# Patient Record
Sex: MALE | Race: WHITE | NOT HISPANIC OR LATINO | URBAN - METROPOLITAN AREA
[De-identification: names, ages, dates, MRNs, and addresses within clinical notes are randomized per-mention and may not be internally consistent; named-entity substitution may affect disease eponyms.]

---

## 2023-11-16 ENCOUNTER — APPOINTMENT (OUTPATIENT)
Dept: PRIMARY CARE | Facility: CLINIC | Age: 68
End: 2023-11-16
Payer: MEDICARE

## 2024-01-19 ENCOUNTER — APPOINTMENT (OUTPATIENT)
Dept: PRIMARY CARE | Facility: CLINIC | Age: 69
End: 2024-01-19
Payer: MEDICARE

## 2024-02-01 ENCOUNTER — EVALUATION (OUTPATIENT)
Dept: PHYSICAL THERAPY | Facility: CLINIC | Age: 69
End: 2024-02-01
Payer: MEDICARE

## 2024-02-01 DIAGNOSIS — M54.2 CERVICALGIA: ICD-10-CM

## 2024-02-01 PROCEDURE — 97161 PT EVAL LOW COMPLEX 20 MIN: CPT | Mod: GP

## 2024-02-01 PROCEDURE — 97110 THERAPEUTIC EXERCISES: CPT | Mod: GP

## 2024-02-01 ASSESSMENT — PAIN SCALES - GENERAL: PAINLEVEL_OUTOF10: 0 - NO PAIN

## 2024-02-01 ASSESSMENT — PAIN - FUNCTIONAL ASSESSMENT: PAIN_FUNCTIONAL_ASSESSMENT: 0-10

## 2024-02-01 ASSESSMENT — PAIN DESCRIPTION - DESCRIPTORS: DESCRIPTORS: ACHING

## 2024-02-01 NOTE — PROGRESS NOTES
Physical Therapy Evaluation and Treatment      Patient Name: Peewee Valle  MRN: [id number]  Today's Date: 2/1/2024  Time Calculation  Start Time: 0800  Stop Time: 0845  Time Calculation (min): 45 min  PT Evaluation Time Entry  PT Evaluation (Low) Time Entry: 35     PT Therapeutic Procedures Time Entry  Therapeutic Exercise Time Entry: 10    Visit Number:  1 (including evaluation)  Planned total visits: 6  Insurance authorization information: MEDICARE A/B ACTIVE/ $240 DED (MET)/ 80% COVERAGE/ $0 PT/ST USED TO DATE/ AARP ACTIVE SECONDARY/ NO AUTH/ PER RTE    Current Problem:   1. Cervicalgia  PT eval and treat    Follow Up In Physical Therapy          Relevant Imaging:  No recent relevant imaging available     Subjective  /General:  General  Reason for Referral: Cervicalgia  Referred By: Usman Rose PA-C  Patient reported hx of current condition: The pt states that he has had chronic bilat shoulder and neck pain. He notes that in 2022 he had PT that focused on both shoulders, which helped, but then in early 2023, the pain moved toward the neck. He notes that he had PT again in July of 2023, with little relief. He notes that he continued the exercises for a short time after, but then stopped doing them. He notes that most of his symptoms are stiffness.    Aggravating factors: turning his head L to R - pt struggles with looking over shoulder while driving  Relieving factors: moving around, heat, gentle stretch     Precautions:  Precautions  Hearing/Visual Limitations: R hearing aid  Precautions Comment: pt denies red flags    Red flags   Hx of CA No   Pacemaker/ Electronic Implant No   Sudden Onset/ New or Changing HAs No   Sudden Weakness No   Bowel/Bladder Changes No   Nausea No   Nystagmus No   Ataxia No   Diplopia No   Dizziness No   Dysarthria No   Dysphagia No   Drop Attacks No   Recent Falls (within last 6mo) No     Pain:  Pain Assessment  Pain Assessment: 0-10  Pain Score: 0 - No pain (no pain at rest; 5/10 with  "turning head)  Pain Type: Chronic pain  Pain Location: Neck  Pain Orientation: Mid  Pain Radiating Towards: no radiations  Pain Descriptors: Aching  Pain Frequency: Intermittent  Home Living:    Lives with: Spouse  Home type: House  Stairs: Yes with handrails  Prior Level of Function:  Prior Function Per Pt/Caregiver Report  Vocational: Retired  Hand Dominance: Right    Objective   Posture:  Posture Comment: Pt seated with forward head, rounded shoulders, increased thoracic kyphosis, and flattened lumbar lordosis  Palpation:  Palpation Comment: Mild hypertonicity through upper quarter musculature, most notable in bilat UT, no tenderness to palpation reported. Supine PA springing C3-T1 with generalized hypomobility, lateral c-spine glides C3-T1 mildly hypomobile with R to L glides, moderately hypomobile with L to R glides  Gait:  Gait Comment: Pt ambulates into the clinic indep without AD. No significant deviations noted this date.  Other:       Cervical AROM Range   Flexion 60deg   Extension 45deg   R rotation 60deg*   L rotation 55deg* more painful than R rotation   R sidebend 30deg   L sidebend 30deg      Shoulder AROM L R   Flexion 160 deg 160 deg   Abduction 160 deg 160 deg   Functional External Rotation T3 T3   Functional Internal Rotation T10 L1      Shoulder MMT L R   Flexion 5/5 5/5   Abduction 5/5 5/5   External Rotation 5/5 5/5   Internal Rotation 5/5 5/5   Middle trap 4+/5 4+/5   Low trap 4+/5 4+/5      Elbow MMT L R   Flexion 5/5 5/5   Extension 5/5 5/5      Wrist MMT L R   Flexion 5/5 5/5   Extension 5/5 5/5      Special Tests: +/-   Spurlings Slightly + on L    Cervical distraction -     Outcome Measures:  Other Measures  Oswestry Disablity Index (GUY): 5/50     Treatments:  Ther-ex:  - Seated scapular retraction + depression 3\" hold x5  - Seated cervical AROM - all directions - emphasis on straight plane motions in pain free range  - Seated SNAGs with towel into extension 3\" hold x5  - Seated SNAGs " "with towel into rotation 3\" hold x5 bilat     Ther-ex Time: 10mins    OP EDUCATION:  Outpatient Education  Individual(s) Educated: Patient  Education Provided: Anatomy, Home Exercise Program, POC, Posture  Risk and Benefits Discussed with Patient/Caregiver/Other: yes  Patient/Caregiver Demonstrated Understanding: yes  Plan of Care Discussed and Agreed Upon: yes  Patient Response to Education: Patient/Caregiver Verbalized Understanding of Information, Patient/Caregiver Performed Return Demonstration of Exercises/Activities, Patient/Caregiver Asked Appropriate Questions  Education Comment: [employer] Access Code: [email or web address]    HEP to be completed daily, exercises listed in patient instructions.    Assessment:  PT Assessment Results: Decreased strength, Decreased range of motion, Pain  Rehab Prognosis: Excellent    Pt is a 69 y.o. male who presents with signs and symptoms consistent with degenerative changes of the C-spine. The current impairments have led to functional limitations including observing surroundings while driving. Pt would benefit from skilled physical therapy intervention to improve impairments and facilitate return to prior function.    Plan:  Treatment/Interventions: Cryotherapy, Dry needling, Electrical stimulation, Gait training, Hot pack, Manual therapy, Mechanical traction, Neuromuscular re-education, Taping techniques, Therapeutic activities, Therapeutic exercises, Ultrasound  PT Plan: Skilled PT  PT Frequency: 1 time per week  Duration: 6 total sessions  Onset Date: 02/01/24  Certification Period Start Date: 02/01/24  Certification Period End Date: 05/01/24  Number of Treatments Authorized: MN  Rehab Potential: Excellent  Plan of Care Agreement: Patient  HAVE PATIENT COMPLETE NDI AT FOLLOW-UP    Goals:  Active       PT Problem       PT STG       Start:  02/01/24    Expected End:  03/17/24       - Pt will complete the HEP with <3 verbal cues for correction,   - Pt will demonstrate 2pt improvement on " the NPRS, allowing for improved tolerance of functional activities.,   - Pt will demonstrate at least 5 deg improvement in all cervical AROM, without onset of pain, allowing for improved ability to observe surroundings during driving.         PT LTG       Start:  02/01/24    Expected End:  05/01/24       - Pt will be independent in HEP & symptom management,   - Pt will report 4pt reduction of cervical pain on the NPRS, allowing for improved tolerance to perform functional activities.,   - Pt will demonstrate full ROM of cervical spine without onset of pain, allowing for completion of functional activities, such as driving, without compensation.  ,   - Pt will obtain and maintain a neutral posture throughout a PT session in order to allow for improved muscle length tension relationships and proper muscle recruitment during daily tasks.,   - Pt will demonstrate 5/5 MMT grading throughout upper extremity musculature, allowing for appropriate muscle recruitment during daily activities. ,   - Pt will demonstrate improved NDI score by 9 points (MCID) in order to demonstrate improved functional mobility. ,          Patient Stated Goal 1       Start:  02/01/24    Expected End:  05/01/24       Alleviate pain

## 2024-02-08 ENCOUNTER — APPOINTMENT (OUTPATIENT)
Dept: PHYSICAL THERAPY | Facility: CLINIC | Age: 69
End: 2024-02-08
Payer: MEDICARE

## 2024-02-15 ENCOUNTER — TREATMENT (OUTPATIENT)
Dept: PHYSICAL THERAPY | Facility: CLINIC | Age: 69
End: 2024-02-15
Payer: MEDICARE

## 2024-02-15 DIAGNOSIS — M54.2 CERVICALGIA: ICD-10-CM

## 2024-02-15 PROCEDURE — 97140 MANUAL THERAPY 1/> REGIONS: CPT | Mod: GP

## 2024-02-15 PROCEDURE — 97110 THERAPEUTIC EXERCISES: CPT | Mod: GP

## 2024-02-15 ASSESSMENT — PAIN SCALES - GENERAL: PAINLEVEL_OUTOF10: 2

## 2024-02-15 NOTE — PROGRESS NOTES
"Physical Therapy Treatment    Patient Name: Peewee Valle  MRN: [id number]  Today's Date: 2/15/2024  Time Calculation  Start Time: 0845  Stop Time: 0930  Time Calculation (min): 45 min     PT Therapeutic Procedures Time Entry  Manual Therapy Time Entry: 25  Therapeutic Exercise Time Entry: 20    Visit Number:  2 (including evaluation)  Planned total visits: 6  Visit Authorized/ Insurance Considerations:  MEDICARE A/B ACTIVE/ $240 DED (MET)/ 80% COVERAGE/ $0 PT/ST USED TO DATE/ AARP ACTIVE SECONDARY/ NO AUTH/ PER RTE     Current Problem  1. Cervicalgia  Follow Up In Physical Therapy        Precautions  Precautions  Hearing/Visual Limitations: R hearing aid  Precautions Comment: pt denies red flags    Pain  Pain Score: 2  Pain Type: Chronic pain  Pain Location: Neck    Subjective/General:  Reason for Referral: Cervicalgia  Referred By: Usman Rose PA-C   The pt returns to the clinic stating that his up and down movement has been improving, but he continues to struggle with turning his head    Objective  NDI 2/50   End of session cervical AROM rotation: L = ~40deg, R = ~50deg    Treatments:  Ther-ex:  - UE ergometer fwd/retro x2mins ea  - Scapular retraction +depression x10   - Cervical AROM - all directions x10   - Self SNAGs 5\" hold x10  - Self SNAGs with rotation x10 ea     Manual:  - Supine STM/MFR through the suboccipitals, cerv paraspinals, UT, levator  - Supine manual cervical traction (grade III-IV)  - Supine PA mobilization C2-T2 (grade III-IV)  - Supine lateral glides C3-T1 (grade III-IV)  - IMP and SAL glides C3-T1 (grade III-IV)    OP Education:  Outpatient Education  Education Comment: [facility] Access Code: [email or web address]    Current HEP:  From eval:  - Scapular retraction +depression   - Cervical AROM - all directions   - Self SNAGs 5\" hold   - Self SNAGs with rotation      Assessment:   Pt's response to treatment:  Good - the pt was able to improve pain free cervical rotation ROM by end of session with only pain " remaining at end ranges of movement.  Areas of improvements:  cervical AROM  Limitations/deficits:  pain at end range    Pain end of session:  2/10 at end ranges of rotation    Plan:  OP PT Plan  Treatment/Interventions: Cryotherapy, Dry needling, Electrical stimulation, Gait training, Hot pack, Manual therapy, Mechanical traction, Neuromuscular re-education, Taping techniques, Therapeutic activities, Therapeutic exercises, Ultrasound  PT Plan: Skilled PT  PT Frequency: 1 time per week  Duration: 6 total sessions  Onset Date: 02/01/24  Certification Period Start Date: 02/01/24  Certification Period End Date: 05/01/24  Number of Treatments Authorized: MN  Rehab Potential: Excellent  Plan of Care Agreement: Patient  Continue with current POC/no changes  Consider adding scapular stabilizer strengthening next session - add to HEP as appropriate    Assessment of current progress against goals:  Insufficient treatment time to assess progress  Goals:  Active       PT Problem       PT STG       Start:  02/01/24    Expected End:  03/17/24       - Pt will complete the HEP with <3 verbal cues for correction,   - Pt will demonstrate 2pt improvement on the NPRS, allowing for improved tolerance of functional activities.,   - Pt will demonstrate at least 5 deg improvement in all cervical AROM, without onset of pain, allowing for improved ability to observe surroundings during driving.         PT LTG       Start:  02/01/24    Expected End:  05/01/24       - Pt will be independent in HEP & symptom management,   - Pt will report 4pt reduction of cervical pain on the NPRS, allowing for improved tolerance to perform functional activities.,   - Pt will demonstrate full ROM of cervical spine without onset of pain, allowing for completion of functional activities, such as driving, without compensation.  ,   - Pt will obtain and maintain a neutral posture throughout a PT session in order to allow for improved muscle length tension  relationships and proper muscle recruitment during daily tasks.,   - Pt will demonstrate 5/5 MMT grading throughout upper extremity musculature, allowing for appropriate muscle recruitment during daily activities. ,   - Pt will demonstrate improved NDI score by 9 points (MCID) in order to demonstrate improved functional mobility. ,          Patient Stated Goal 1       Start:  02/01/24    Expected End:  05/01/24       Alleviate pain

## 2024-02-22 ENCOUNTER — TREATMENT (OUTPATIENT)
Dept: PHYSICAL THERAPY | Facility: CLINIC | Age: 69
End: 2024-02-22
Payer: MEDICARE

## 2024-02-22 DIAGNOSIS — M54.2 CERVICALGIA: ICD-10-CM

## 2024-02-22 PROCEDURE — 97140 MANUAL THERAPY 1/> REGIONS: CPT | Mod: GP

## 2024-02-22 PROCEDURE — 97110 THERAPEUTIC EXERCISES: CPT | Mod: GP

## 2024-02-22 ASSESSMENT — PAIN SCALES - GENERAL: PAINLEVEL_OUTOF10: 3

## 2024-02-22 NOTE — PROGRESS NOTES
"Physical Therapy Treatment    Patient Name: Peewee Valle  MRN: [id number]  Today's Date: 2/22/2024  Time Calculation  Start Time: 0800  Stop Time: 0840  Time Calculation (min): 40 min     PT Therapeutic Procedures Time Entry  Manual Therapy Time Entry: 20  Therapeutic Exercise Time Entry: 20    Visit Number:  3 (including evaluation)  Planned total visits: 6  Visit Authorized/ Insurance Considerations:  MEDICARE A/B ACTIVE/ $240 DED (MET)/ 80% COVERAGE/ $0 PT/ST USED TO DATE/ AARP ACTIVE SECONDARY/ NO AUTH/ PER RTE     Current Problem  1. Cervicalgia  Follow Up In Physical Therapy        Precautions  Precautions  Hearing/Visual Limitations: R hearing aid  Precautions Comment: pt denies red flags    Pain  Pain Score: 3  Pain Type: Chronic pain  Pain Location: Neck    Subjective/General:  Reason for Referral: Cervicalgia  Referred By: Usman Rose PA-C   The pt returns to the clinic stating that he felt good after last session for about 4 days, but than had to lift his grandson and had some pain with that. Continues to have pain with end ranges of cervical extension and rotation.    Objective  Pt stands with forward head, rounded shoulders, and increased upper and mid thoracic kyphosis - able to correct with cues.     Treatments:  Ther-ex:   - UE ergometer fwd/retro x3mins ea  Standing - emphasis on posture   - Rows with BTB 2x10  - Shoulder extensions with BTB 2x10  - Bilat shoulder ER with OTB 2x10  - D2 flexion with OTB+Bremer TB 2x10    Manual:  - Supine STM/MFR through the suboccipitals, cerv paraspinals, UT, levator  - Supine manual cervical traction (grade III-IV)  - Supine PA mobilization C2-T2 (grade III-IV)  - Supine lateral glides C3-T1 (grade III-IV)  - IMP and SAL glides C3-T1 (grade III-IV)    OP Education:  Outpatient Education  Education Comment: [facility] Access Code: [email or web address]    Current HEP:  From eval:  - Scapular retraction +depression   - Cervical AROM - all directions   - Self SNAGs 5\" hold   - Self " "SNAGs with rotation      2/22/24  - Rows with BTB   - Shoulder extensions with BTB   - Bilat shoulder ER with OTB   - D2 flexion with OTB+[place] TB     Assessment:   Pt's response to treatment:  Good - The pt was able to tolerate introduction to scapular stabilizer strengthening without ill effects, however requires intermittent cues for postural correction throughout.   Areas of improvements: able to perform strengthening  Limitations/deficits:  pain at end range    Pain end of session:  2/10 \"just mainly stiffness remaining in mid c-spine\"     Plan:  OP PT Plan  Treatment/Interventions: Cryotherapy, Dry needling, Electrical stimulation, Gait training, Hot pack, Manual therapy, Mechanical traction, Neuromuscular re-education, Taping techniques, Therapeutic activities, Therapeutic exercises, Ultrasound  PT Plan: Skilled PT  PT Frequency: 1 time per week  Duration: 6 total sessions  Onset Date: 02/01/24  Certification Period Start Date: 02/01/24  Certification Period End Date: 05/01/24  Number of Treatments Authorized: MN  Rehab Potential: Excellent  Plan of Care Agreement: Patient  Continue with current POC/no changes  Progress strengthening as tolerated and continue with manual interventions to reduce pain and improve ROM.    Assessment of current progress against goals:  Insufficient treatment time to assess progress  Goals:  Active       PT Problem       PT STG       Start:  02/01/24    Expected End:  03/17/24       - Pt will complete the HEP with <3 verbal cues for correction,   - Pt will demonstrate 2pt improvement on the NPRS, allowing for improved tolerance of functional activities.,   - Pt will demonstrate at least 5 deg improvement in all cervical AROM, without onset of pain, allowing for improved ability to observe surroundings during driving.         PT LTG       Start:  02/01/24    Expected End:  05/01/24       - Pt will be independent in HEP & symptom management,   - Pt will report 4pt reduction of " cervical pain on the NPRS, allowing for improved tolerance to perform functional activities.,   - Pt will demonstrate full ROM of cervical spine without onset of pain, allowing for completion of functional activities, such as driving, without compensation.  ,   - Pt will obtain and maintain a neutral posture throughout a PT session in order to allow for improved muscle length tension relationships and proper muscle recruitment during daily tasks.,   - Pt will demonstrate 5/5 MMT grading throughout upper extremity musculature, allowing for appropriate muscle recruitment during daily activities. ,   - Pt will demonstrate improved NDI score by 9 points (MCID) in order to demonstrate improved functional mobility. ,          Patient Stated Goal 1       Start:  02/01/24    Expected End:  05/01/24       Alleviate pain

## 2024-02-29 ENCOUNTER — TREATMENT (OUTPATIENT)
Dept: PHYSICAL THERAPY | Facility: CLINIC | Age: 69
End: 2024-02-29
Payer: MEDICARE

## 2024-02-29 DIAGNOSIS — M54.2 CERVICALGIA: ICD-10-CM

## 2024-02-29 PROCEDURE — 97110 THERAPEUTIC EXERCISES: CPT | Mod: GP

## 2024-02-29 PROCEDURE — 97140 MANUAL THERAPY 1/> REGIONS: CPT | Mod: GP

## 2024-02-29 ASSESSMENT — PAIN SCALES - GENERAL: PAINLEVEL_OUTOF10: 2

## 2024-02-29 NOTE — PROGRESS NOTES
"Physical Therapy Treatment    Patient Name: Peewee Valle  MRN: [id number]  Today's Date: 2/29/2024  Time Calculation  Start Time: 0800  Stop Time: 0840  Time Calculation (min): 40 min     PT Therapeutic Procedures Time Entry  Manual Therapy Time Entry: 20  Therapeutic Exercise Time Entry: 20    Visit Number:  4 (including evaluation)  Planned total visits: 6  Visit Authorized/ Insurance Considerations:  MEDICARE A/B ACTIVE/ $240 DED (MET)/ 80% COVERAGE/ $0 PT/ST USED TO DATE/ AARP ACTIVE SECONDARY/ NO AUTH/ PER RTE     Current Problem  1. Cervicalgia  Follow Up In Physical Therapy        Precautions  Precautions  Hearing/Visual Limitations: R hearing aid  Precautions Comment: pt denies red flags    Pain  Pain Score: 2  Pain Type: Chronic pain  Pain Location: Neck    Subjective/General:  Reason for Referral: Cervicalgia  Referred By: Usman Rose PA-C   The pt returns to the clinic stating that he was feeling really good with the warm weather while and he was able to get out and do a lot, but then it was more sore again yesterday.    Objective  Pt stands with forward head, rounded shoulders, and increased upper and mid thoracic kyphosis - able to correct with cues.   Start of session cervical rotation ROM: ~45deg ea side with pain at end range bilat  End of session cervical rotation ROM: ~60deg to the L and ~50deg to the R    Treatments:  Ther-ex:   - UE ergometer fwd/retro x2mins ea  Leaning over a SB  - ITY with 2# weights 2x10 bilat   Seated   - Cervical isometrics 5\" holds x10 ea    Manual:  - Supine STM/MFR through the suboccipitals, cerv paraspinals, UT, levator  - Supine manual cervical traction (grade III-IV)  - Supine PA mobilization C2-T2 (grade III-IV)  - Supine lateral glides C3-T1 (grade III-IV)  - IMP and SAL glides C3-T1 (grade III-IV)    OP Education:  Outpatient Education  Education Comment: [facility] Access Code: [email or web address]    Current HEP:  From eval:  - Scapular retraction +depression   - Cervical AROM " "- all directions   - Self SNAGs 5\" hold   - Self SNAGs with rotation      2/22/24  - Rows with BTB   - Shoulder extensions with BTB   - Bilat shoulder ER with OTB   - D2 flexion with OTB+[place] TB     Assessment:   Pt's response to treatment:  Good - Pt was able to tolerate increased scapular stabilizer strengthening without ill effects. The pt continues to struggle with rotation ROM, however exercises and manual all assist in decreasing stiffness and pain.  Areas of improvements: Progressions of strength  Limitations/deficits:  pain and limited ROM with rotation    Pain end of session: 1/10 at rest, 2/10 when turning to the R, 0/10 when turning to the L    Plan:  OP PT Plan  Treatment/Interventions: Cryotherapy, Dry needling, Electrical stimulation, Gait training, Hot pack, Manual therapy, Mechanical traction, Neuromuscular re-education, Taping techniques, Therapeutic activities, Therapeutic exercises, Ultrasound  PT Plan: Skilled PT  PT Frequency: 1 time per week  Duration: 6 total sessions  Onset Date: 02/01/24  Certification Period Start Date: 02/01/24  Certification Period End Date: 05/01/24  Number of Treatments Authorized: MN  Rehab Potential: Excellent  Plan of Care Agreement: Patient  Continue with current POC/no changes  Progress strengthening as tolerated and continue with manual interventions to reduce pain and improve ROM.    Assessment of current progress against goals:  Insufficient treatment time to assess progress  Goals:  Active       PT Problem       PT STG       Start:  02/01/24    Expected End:  03/17/24       - Pt will complete the HEP with <3 verbal cues for correction,   - Pt will demonstrate 2pt improvement on the NPRS, allowing for improved tolerance of functional activities.,   - Pt will demonstrate at least 5 deg improvement in all cervical AROM, without onset of pain, allowing for improved ability to observe surroundings during driving.         PT LTG       Start:  02/01/24    Expected " End:  05/01/24       - Pt will be independent in HEP & symptom management,   - Pt will report 4pt reduction of cervical pain on the NPRS, allowing for improved tolerance to perform functional activities.,   - Pt will demonstrate full ROM of cervical spine without onset of pain, allowing for completion of functional activities, such as driving, without compensation.  ,   - Pt will obtain and maintain a neutral posture throughout a PT session in order to allow for improved muscle length tension relationships and proper muscle recruitment during daily tasks.,   - Pt will demonstrate 5/5 MMT grading throughout upper extremity musculature, allowing for appropriate muscle recruitment during daily activities. ,   - Pt will demonstrate improved NDI score by 9 points (MCID) in order to demonstrate improved functional mobility. ,          Patient Stated Goal 1       Start:  02/01/24    Expected End:  05/01/24       Alleviate pain

## 2024-03-04 ENCOUNTER — APPOINTMENT (OUTPATIENT)
Dept: UROLOGY | Facility: CLINIC | Age: 69
End: 2024-03-04
Payer: MEDICARE

## 2024-03-07 ENCOUNTER — TREATMENT (OUTPATIENT)
Dept: PHYSICAL THERAPY | Facility: CLINIC | Age: 69
End: 2024-03-07
Payer: MEDICARE

## 2024-03-07 DIAGNOSIS — M54.2 CERVICALGIA: ICD-10-CM

## 2024-03-07 PROCEDURE — 97140 MANUAL THERAPY 1/> REGIONS: CPT | Mod: GP

## 2024-03-07 PROCEDURE — 97110 THERAPEUTIC EXERCISES: CPT | Mod: GP

## 2024-03-07 ASSESSMENT — PAIN SCALES - GENERAL: PAINLEVEL_OUTOF10: 0 - NO PAIN

## 2024-03-07 NOTE — PROGRESS NOTES
"Physical Therapy Treatment    Patient Name: Peewee Valle  MRN: [id number]  Today's Date: 3/7/2024  Time Calculation  Start Time: 0800  Stop Time: 0840  Time Calculation (min): 40 min     PT Therapeutic Procedures Time Entry  Manual Therapy Time Entry: 15  Therapeutic Exercise Time Entry: 25    Visit Number:  5 (including evaluation)  Planned total visits: 6  Visit Authorized/ Insurance Considerations:  MEDICARE A/B ACTIVE/ $240 DED (MET)/ 80% COVERAGE/ $0 PT/ST USED TO DATE/ [employer] ACTIVE SECONDARY/ NO AUTH/ PER RTE     Current Problem  1. Cervicalgia  Follow Up In Physical Therapy        Precautions  Precautions  Hearing/Visual Limitations: R hearing aid  Precautions Comment: pt denies red flags    Pain  Pain Score: 0 - No pain (Only pain occcurs when he turns his head to end range, rates that at 2/10.)    Subjective/General:  Reason for Referral: Cervicalgia  Referred By: Usman Rose PA-C   The pt returns to the clinic stating that he is doing well - only pain occurs when forcing end range cervical rotation.    Objective  Start of session cervical rotation ROM: ~45deg ea side with pain at end range bilat  End of session cervical rotation ROM: ~50deg ea side     Treatments:  Ther-ex:   - UE ergometer fwd/retro x2mins ea  - Postural correction + ball on wall alphabets with ea UE  Seated   - Cervical isometrics 5\" holds x10 ea  - Self MET: cervical rotation AROM with opposing gaze at end range 10\" holds x3 ea side     Manual:  - Supine STM/MFR through the suboccipitals, cerv paraspinals, UT, levator  - Supine manual cervical traction (grade III-IV)  - Supine PA mobilization C2-T2 (grade III-IV)  - Supine lateral glides C3-T1 (grade III-IV)  - IMP and SAL glides C3-T1 (grade III-IV)    OP Education:  Outpatient Education  Education Comment: [facility] Access Code: [email or web address]    Current HEP:  From eval:  - Scapular retraction +depression   - Cervical AROM - all directions   - Self SNAGs 5\" hold   - Self SNAGs with rotation  "     2/22/24  - Rows with BTB   - Shoulder extensions with BTB   - Bilat shoulder ER with OTB   - D2 flexion with OTB+[place] TB     2/29/24  - ITYs     3/7/24  - Cervical isometrics     Assessment:   Pt's response to treatment:  Good - The pts symptoms appear to be gradually improving with continued scapular stabilizer strengthening and cervical mobility exercises.   Areas of improvements: Progressions of strength  Limitations/deficits:  limited ROM with rotation    Pain end of session: 0/10 at rest, 1/10 when turning to the R, 1.5/10 when turning to the L    Plan:  OP PT Plan  Treatment/Interventions: Cryotherapy, Dry needling, Electrical stimulation, Gait training, Hot pack, Manual therapy, Mechanical traction, Neuromuscular re-education, Taping techniques, Therapeutic activities, Therapeutic exercises, Ultrasound  PT Plan: Skilled PT  PT Frequency: 1 time per week  Duration: 6 total sessions  Onset Date: 02/01/24  Certification Period Start Date: 02/01/24  Certification Period End Date: 05/01/24  Number of Treatments Authorized: MN  Rehab Potential: Excellent  Plan of Care Agreement: Patient  Continue with current POC/no changes  Progress strengthening as tolerated and continue with manual interventions to reduce pain and improve ROM.  - During the POC, a transfer of care to another supervising PT may occur d/t anticipated FMLA.     Assessment of current progress against goals:  Insufficient treatment time to assess progress  Goals:  Active       PT Problem       PT STG       Start:  02/01/24    Expected End:  03/17/24       - Pt will complete the HEP with <3 verbal cues for correction,   - Pt will demonstrate 2pt improvement on the NPRS, allowing for improved tolerance of functional activities.,   - Pt will demonstrate at least 5 deg improvement in all cervical AROM, without onset of pain, allowing for improved ability to observe surroundings during driving.         PT LTG       Start:  02/01/24    Expected End:   05/01/24       - Pt will be independent in HEP & symptom management,   - Pt will report 4pt reduction of cervical pain on the NPRS, allowing for improved tolerance to perform functional activities.,   - Pt will demonstrate full ROM of cervical spine without onset of pain, allowing for completion of functional activities, such as driving, without compensation.  ,   - Pt will obtain and maintain a neutral posture throughout a PT session in order to allow for improved muscle length tension relationships and proper muscle recruitment during daily tasks.,   - Pt will demonstrate 5/5 MMT grading throughout upper extremity musculature, allowing for appropriate muscle recruitment during daily activities. ,   - Pt will demonstrate improved NDI score by 9 points (MCID) in order to demonstrate improved functional mobility. ,          Patient Stated Goal 1       Start:  02/01/24    Expected End:  05/01/24       Alleviate pain

## 2024-03-21 ENCOUNTER — APPOINTMENT (OUTPATIENT)
Dept: PHYSICAL THERAPY | Facility: CLINIC | Age: 69
End: 2024-03-21
Payer: MEDICARE

## 2024-03-29 PROBLEM — I10 ESSENTIAL HYPERTENSION: Status: ACTIVE | Noted: 2020-10-14

## 2024-03-29 PROBLEM — K92.2 GASTROINTESTINAL HEMORRHAGE: Status: RESOLVED | Noted: 2024-03-29 | Resolved: 2024-03-29

## 2024-03-29 PROBLEM — R05.9 COUGH: Status: RESOLVED | Noted: 2018-11-19 | Resolved: 2024-03-29

## 2024-03-29 PROBLEM — R19.7 DIARRHEA: Status: RESOLVED | Noted: 2020-08-18 | Resolved: 2024-03-29

## 2024-03-29 PROBLEM — E78.5 DYSLIPIDEMIA: Status: ACTIVE | Noted: 2020-08-24

## 2024-03-29 PROBLEM — K21.9 GERD (GASTROESOPHAGEAL REFLUX DISEASE): Status: ACTIVE | Noted: 2020-02-24

## 2024-03-29 PROBLEM — D12.6 TUBULAR ADENOMA OF COLON: Status: ACTIVE | Noted: 2020-08-28

## 2024-03-29 PROBLEM — E78.00 PURE HYPERCHOLESTEROLEMIA, UNSPECIFIED: Status: ACTIVE | Noted: 2018-08-06

## 2024-03-29 PROBLEM — D50.9 IRON DEFICIENCY ANEMIA, UNSPECIFIED: Status: ACTIVE | Noted: 2019-02-25

## 2024-03-29 PROBLEM — E78.1 HYPERTRIGLYCERIDEMIA: Status: ACTIVE | Noted: 2019-09-20

## 2024-03-29 PROBLEM — K27.9 PEPTIC ULCER DISEASE: Status: ACTIVE | Noted: 2019-09-20

## 2024-03-29 PROBLEM — I82.90 VENOUS THROMBOSIS: Status: ACTIVE | Noted: 2024-03-29

## 2024-03-29 PROBLEM — R93.1 AGATSTON CORONARY ARTERY CALCIUM SCORE GREATER THAN 400: Status: ACTIVE | Noted: 2020-11-13

## 2024-03-29 PROBLEM — R52 PAIN: Status: RESOLVED | Noted: 2024-03-29 | Resolved: 2024-03-29

## 2024-03-29 PROBLEM — N18.2 CHRONIC KIDNEY DISEASE, STAGE 2 (MILD): Status: ACTIVE | Noted: 2018-08-07

## 2024-03-29 PROBLEM — H61.20 CERUMEN IMPACTION: Status: RESOLVED | Noted: 2020-10-14 | Resolved: 2024-03-29

## 2024-03-29 PROBLEM — E66.9 OBESITY WITH BODY MASS INDEX 30 OR GREATER: Status: ACTIVE | Noted: 2020-08-18

## 2024-03-29 PROBLEM — I25.10 CORONARY ARTERIOSCLEROSIS: Status: ACTIVE | Noted: 2020-11-13

## 2024-03-29 PROBLEM — N52.9 MALE ERECTILE DISORDER: Status: ACTIVE | Noted: 2021-05-20

## 2024-03-29 PROBLEM — M47.812 SPONDYLOSIS OF CERVICAL REGION WITHOUT MYELOPATHY OR RADICULOPATHY: Status: ACTIVE | Noted: 2020-12-31

## 2024-03-29 RX ORDER — TADALAFIL 20 MG/1
TABLET ORAL
COMMUNITY
Start: 2024-03-08

## 2024-03-29 RX ORDER — FENOFIBRATE 145 MG/1
145 TABLET, FILM COATED ORAL
COMMUNITY
Start: 2019-05-02

## 2024-03-29 RX ORDER — PANTOPRAZOLE SODIUM 40 MG/1
TABLET, DELAYED RELEASE ORAL
COMMUNITY

## 2024-03-29 RX ORDER — GARLIC 500 MG
CAPSULE ORAL
COMMUNITY

## 2024-03-29 RX ORDER — LISINOPRIL 5 MG/1
5 TABLET ORAL
COMMUNITY
Start: 2023-10-09

## 2024-03-29 RX ORDER — NAPROXEN SODIUM 220 MG/1
TABLET, FILM COATED ORAL
COMMUNITY

## 2024-03-29 RX ORDER — UBIDECARENONE 75 MG
CAPSULE ORAL
COMMUNITY

## 2024-03-29 RX ORDER — ROSUVASTATIN CALCIUM 10 MG/1
TABLET, COATED ORAL
COMMUNITY

## 2024-03-29 RX ORDER — MULTIVITAMIN
1 TABLET ORAL
COMMUNITY
Start: 2020-10-14

## 2024-04-22 ENCOUNTER — APPOINTMENT (OUTPATIENT)
Dept: PRIMARY CARE | Facility: CLINIC | Age: 69
End: 2024-04-22
Payer: MEDICARE

## 2024-05-02 ENCOUNTER — OFFICE VISIT (OUTPATIENT)
Dept: PRIMARY CARE | Facility: CLINIC | Age: 69
End: 2024-05-02
Payer: MEDICARE

## 2024-05-02 VITALS
HEART RATE: 72 BPM | SYSTOLIC BLOOD PRESSURE: 144 MMHG | DIASTOLIC BLOOD PRESSURE: 86 MMHG | HEIGHT: 68 IN | TEMPERATURE: 98.3 F | BODY MASS INDEX: 30.01 KG/M2 | WEIGHT: 198 LBS | RESPIRATION RATE: 18 BRPM | OXYGEN SATURATION: 97 %

## 2024-05-02 DIAGNOSIS — J30.1 ALLERGIC RHINITIS DUE TO POLLEN, UNSPECIFIED SEASONALITY: Primary | ICD-10-CM

## 2024-05-02 DIAGNOSIS — N18.2 CHRONIC KIDNEY DISEASE, STAGE 2 (MILD): ICD-10-CM

## 2024-05-02 DIAGNOSIS — E78.1 HYPERTRIGLYCERIDEMIA: ICD-10-CM

## 2024-05-02 DIAGNOSIS — Z00.00 ROUTINE MEDICAL EXAM: ICD-10-CM

## 2024-05-02 DIAGNOSIS — I10 ESSENTIAL HYPERTENSION: ICD-10-CM

## 2024-05-02 LAB
ALBUMIN SERPL-MCNC: 4.3 G/DL (ref 3.5–5)
ALP BLD-CCNC: 95 U/L (ref 35–125)
ALT SERPL-CCNC: 16 U/L (ref 5–40)
ANION GAP SERPL CALC-SCNC: 12 MMOL/L
AST SERPL-CCNC: 13 U/L (ref 5–40)
BASOPHILS # BLD AUTO: 0.03 X10*3/UL (ref 0–0.1)
BASOPHILS NFR BLD AUTO: 0.5 %
BILIRUB SERPL-MCNC: 0.6 MG/DL (ref 0.1–1.2)
BUN SERPL-MCNC: 18 MG/DL (ref 8–25)
CALCIUM SERPL-MCNC: 9.6 MG/DL (ref 8.5–10.4)
CHLORIDE SERPL-SCNC: 106 MMOL/L (ref 97–107)
CHOLEST SERPL-MCNC: 236 MG/DL (ref 133–200)
CHOLEST/HDLC SERPL: 6.2 {RATIO}
CO2 SERPL-SCNC: 24 MMOL/L (ref 24–31)
CREAT SERPL-MCNC: 1 MG/DL (ref 0.4–1.6)
EGFRCR SERPLBLD CKD-EPI 2021: 81 ML/MIN/1.73M*2
EOSINOPHIL # BLD AUTO: 0.19 X10*3/UL (ref 0–0.7)
EOSINOPHIL NFR BLD AUTO: 3 %
ERYTHROCYTE [DISTWIDTH] IN BLOOD BY AUTOMATED COUNT: 12.7 % (ref 11.5–14.5)
GLUCOSE SERPL-MCNC: 103 MG/DL (ref 65–99)
HCT VFR BLD AUTO: 41.5 % (ref 41–52)
HDLC SERPL-MCNC: 38 MG/DL
HGB BLD-MCNC: 13.5 G/DL (ref 13.5–17.5)
IMM GRANULOCYTES # BLD AUTO: 0.01 X10*3/UL (ref 0–0.7)
IMM GRANULOCYTES NFR BLD AUTO: 0.2 % (ref 0–0.9)
LDLC SERPL CALC-MCNC: 156 MG/DL (ref 65–130)
LYMPHOCYTES # BLD AUTO: 2.14 X10*3/UL (ref 1.2–4.8)
LYMPHOCYTES NFR BLD AUTO: 33.3 %
MCH RBC QN AUTO: 28.8 PG (ref 26–34)
MCHC RBC AUTO-ENTMCNC: 32.5 G/DL (ref 32–36)
MCV RBC AUTO: 89 FL (ref 80–100)
MONOCYTES # BLD AUTO: 0.42 X10*3/UL (ref 0.1–1)
MONOCYTES NFR BLD AUTO: 6.5 %
NEUTROPHILS # BLD AUTO: 3.64 X10*3/UL (ref 1.2–7.7)
NEUTROPHILS NFR BLD AUTO: 56.5 %
NRBC BLD-RTO: 0 /100 WBCS (ref 0–0)
PLATELET # BLD AUTO: 222 X10*3/UL (ref 150–450)
POTASSIUM SERPL-SCNC: 4.4 MMOL/L (ref 3.4–5.1)
PROT SERPL-MCNC: 6.9 G/DL (ref 5.9–7.9)
RBC # BLD AUTO: 4.69 X10*6/UL (ref 4.5–5.9)
SODIUM SERPL-SCNC: 142 MMOL/L (ref 133–145)
TRIGL SERPL-MCNC: 212 MG/DL (ref 40–150)
TSH SERPL DL<=0.05 MIU/L-ACNC: 2.32 MIU/L (ref 0.27–4.2)
WBC # BLD AUTO: 6.4 X10*3/UL (ref 4.4–11.3)

## 2024-05-02 PROCEDURE — 99397 PER PM REEVAL EST PAT 65+ YR: CPT | Performed by: FAMILY MEDICINE

## 2024-05-02 PROCEDURE — 3079F DIAST BP 80-89 MM HG: CPT | Performed by: FAMILY MEDICINE

## 2024-05-02 PROCEDURE — G0439 PPPS, SUBSEQ VISIT: HCPCS | Performed by: FAMILY MEDICINE

## 2024-05-02 PROCEDURE — 3077F SYST BP >= 140 MM HG: CPT | Performed by: FAMILY MEDICINE

## 2024-05-02 PROCEDURE — 99215 OFFICE O/P EST HI 40 MIN: CPT | Performed by: FAMILY MEDICINE

## 2024-05-02 PROCEDURE — 1126F AMNT PAIN NOTED NONE PRSNT: CPT | Performed by: FAMILY MEDICINE

## 2024-05-02 PROCEDURE — 1036F TOBACCO NON-USER: CPT | Performed by: FAMILY MEDICINE

## 2024-05-02 PROCEDURE — 36415 COLL VENOUS BLD VENIPUNCTURE: CPT | Performed by: FAMILY MEDICINE

## 2024-05-02 PROCEDURE — 84443 ASSAY THYROID STIM HORMONE: CPT | Performed by: FAMILY MEDICINE

## 2024-05-02 PROCEDURE — 80053 COMPREHEN METABOLIC PANEL: CPT | Performed by: FAMILY MEDICINE

## 2024-05-02 PROCEDURE — 1159F MED LIST DOCD IN RCRD: CPT | Performed by: FAMILY MEDICINE

## 2024-05-02 PROCEDURE — 85025 COMPLETE CBC W/AUTO DIFF WBC: CPT | Performed by: FAMILY MEDICINE

## 2024-05-02 PROCEDURE — 80061 LIPID PANEL: CPT | Performed by: FAMILY MEDICINE

## 2024-05-02 RX ORDER — OLOPATADINE HYDROCHLORIDE 665 UG/1
2 SPRAY NASAL 2 TIMES DAILY
Qty: 31 G | Refills: 3 | Status: SHIPPED | OUTPATIENT
Start: 2024-05-02

## 2024-05-02 ASSESSMENT — PAIN SCALES - GENERAL: PAINLEVEL: 0-NO PAIN

## 2024-05-02 ASSESSMENT — PATIENT HEALTH QUESTIONNAIRE - PHQ9
2. FEELING DOWN, DEPRESSED OR HOPELESS: NOT AT ALL
1. LITTLE INTEREST OR PLEASURE IN DOING THINGS: NOT AT ALL
SUM OF ALL RESPONSES TO PHQ9 QUESTIONS 1 AND 2: 0

## 2024-05-02 NOTE — PROGRESS NOTES
"Subjective   Patient ID: Peewee Valle is a 69 y.o. male who presents for new pt (Pt here for new patient wants labs).    HPI pt re something for allergies that doesn't make him tired    Review of Systems    Objective   /86   Pulse 72   Temp 36.8 °C (98.3 °F) (Temporal)   Resp 18   Ht 1.727 m (5' 8\")   Wt 89.8 kg (198 lb)   SpO2 97%   BMI 30.11 kg/m²     Physical Exam  Vitals and nursing note reviewed.   Constitutional:       General: He is not in acute distress.  HENT:      Right Ear: Tympanic membrane and ear canal normal.      Left Ear: Tympanic membrane and ear canal normal.      Nose: Nose normal. No rhinorrhea.      Mouth/Throat:      Pharynx: Oropharynx is clear. No oropharyngeal exudate or posterior oropharyngeal erythema.      Comments: Dentition wnl  Eyes:      Extraocular Movements: Extraocular movements intact.      Conjunctiva/sclera: Conjunctivae normal.      Pupils: Pupils are equal, round, and reactive to light.   Neck:      Vascular: No carotid bruit.   Cardiovascular:      Rate and Rhythm: Normal rate and regular rhythm.      Heart sounds: Normal heart sounds. No murmur heard.  Pulmonary:      Breath sounds: Normal breath sounds. No wheezing or rhonchi.   Abdominal:      General: Bowel sounds are normal. There is no distension.      Palpations: Abdomen is soft. There is no mass.      Tenderness: There is no abdominal tenderness. There is no guarding or rebound.      Hernia: No hernia is present.   Musculoskeletal:         General: No swelling or tenderness. Normal range of motion.      Cervical back: Normal range of motion and neck supple.   Lymphadenopathy:      Cervical: No cervical adenopathy.   Skin:     General: Skin is warm.      Findings: No rash.   Neurological:      General: No focal deficit present.      Mental Status: He is alert.         Assessment/Plan   Problem List Items Addressed This Visit             ICD-10-CM    Hypertriglyceridemia E78.1    Relevant Orders    TSH " with reflex to Free T4 if abnormal    Lipid Panel    Comprehensive Metabolic Panel    CBC and Auto Differential    Essential hypertension I10    Relevant Orders    TSH with reflex to Free T4 if abnormal    Lipid Panel    Comprehensive Metabolic Panel    CBC and Auto Differential    Chronic kidney disease, stage 2 (mild) N18.2    Relevant Orders    Comprehensive Metabolic Panel    Routine medical exam Z00.00

## 2024-05-09 ENCOUNTER — OFFICE VISIT (OUTPATIENT)
Dept: PRIMARY CARE | Facility: CLINIC | Age: 69
End: 2024-05-09
Payer: MEDICARE

## 2024-05-09 VITALS
SYSTOLIC BLOOD PRESSURE: 134 MMHG | RESPIRATION RATE: 18 BRPM | HEART RATE: 70 BPM | BODY MASS INDEX: 30.01 KG/M2 | TEMPERATURE: 97.8 F | HEIGHT: 68 IN | WEIGHT: 198 LBS | OXYGEN SATURATION: 98 % | DIASTOLIC BLOOD PRESSURE: 82 MMHG

## 2024-05-09 DIAGNOSIS — R73.9 HYPERGLYCEMIA: ICD-10-CM

## 2024-05-09 DIAGNOSIS — E78.5 HYPERLIPIDEMIA, UNSPECIFIED HYPERLIPIDEMIA TYPE: Primary | ICD-10-CM

## 2024-05-09 LAB — POC HEMOGLOBIN A1C: 5.2 % (ref 4.2–6.5)

## 2024-05-09 PROCEDURE — 3079F DIAST BP 80-89 MM HG: CPT | Performed by: FAMILY MEDICINE

## 2024-05-09 PROCEDURE — 1159F MED LIST DOCD IN RCRD: CPT | Performed by: FAMILY MEDICINE

## 2024-05-09 PROCEDURE — 83036 HEMOGLOBIN GLYCOSYLATED A1C: CPT | Mod: MUE | Performed by: FAMILY MEDICINE

## 2024-05-09 PROCEDURE — 99213 OFFICE O/P EST LOW 20 MIN: CPT | Performed by: FAMILY MEDICINE

## 2024-05-09 PROCEDURE — 1036F TOBACCO NON-USER: CPT | Performed by: FAMILY MEDICINE

## 2024-05-09 PROCEDURE — 3075F SYST BP GE 130 - 139MM HG: CPT | Performed by: FAMILY MEDICINE

## 2024-05-09 PROCEDURE — 1126F AMNT PAIN NOTED NONE PRSNT: CPT | Performed by: FAMILY MEDICINE

## 2024-05-09 ASSESSMENT — PATIENT HEALTH QUESTIONNAIRE - PHQ9
SUM OF ALL RESPONSES TO PHQ9 QUESTIONS 1 AND 2: 0
2. FEELING DOWN, DEPRESSED OR HOPELESS: NOT AT ALL
1. LITTLE INTEREST OR PLEASURE IN DOING THINGS: NOT AT ALL

## 2024-05-09 ASSESSMENT — PAIN SCALES - GENERAL: PAINLEVEL: 0-NO PAIN

## 2024-05-09 NOTE — PROGRESS NOTES
"Subjective   Patient ID: Peewee Valle is a 69 y.o. male who presents for Follow-up (Pt here to discuss labs).    HPI     Review of Systems    Objective   /82   Pulse 70   Temp 36.6 °C (97.8 °F) (Temporal)   Resp 18   Ht 1.727 m (5' 8\")   Wt 89.8 kg (198 lb)   SpO2 98%   BMI 30.11 kg/m²     Physical Exam  Constitutional:       General: He is not in acute distress.     Appearance: Normal appearance.   Cardiovascular:      Rate and Rhythm: Normal rate and regular rhythm.      Heart sounds: No murmur heard.  Pulmonary:      Breath sounds: Normal breath sounds. No wheezing.   Neurological:      Mental Status: He is alert.       Assessment/Plan   Problem List Items Addressed This Visit             ICD-10-CM    Hyperglycemia R73.9    Relevant Orders    POCT glycosylated hemoglobin (Hb A1C) manually resulted (Completed)    Hyperlipidemia - Primary E78.5          "

## 2024-05-30 ENCOUNTER — APPOINTMENT (OUTPATIENT)
Dept: PRIMARY CARE | Facility: CLINIC | Age: 69
End: 2024-05-30
Payer: MEDICARE

## 2024-09-19 ENCOUNTER — OFFICE VISIT (OUTPATIENT)
Dept: PRIMARY CARE | Facility: CLINIC | Age: 69
End: 2024-09-19
Payer: MEDICARE

## 2024-09-19 ENCOUNTER — HOSPITAL ENCOUNTER (OUTPATIENT)
Dept: RADIOLOGY | Facility: CLINIC | Age: 69
Discharge: HOME | End: 2024-09-19
Payer: MEDICARE

## 2024-09-19 VITALS
OXYGEN SATURATION: 98 % | BODY MASS INDEX: 30.07 KG/M2 | TEMPERATURE: 96.8 F | HEIGHT: 68 IN | HEART RATE: 63 BPM | SYSTOLIC BLOOD PRESSURE: 138 MMHG | RESPIRATION RATE: 18 BRPM | WEIGHT: 198.4 LBS | DIASTOLIC BLOOD PRESSURE: 80 MMHG

## 2024-09-19 DIAGNOSIS — M25.552 PAIN OF LEFT HIP: ICD-10-CM

## 2024-09-19 DIAGNOSIS — M25.552 PAIN OF LEFT HIP: Primary | ICD-10-CM

## 2024-09-19 DIAGNOSIS — N40.0 BENIGN PROSTATIC HYPERPLASIA WITHOUT LOWER URINARY TRACT SYMPTOMS: ICD-10-CM

## 2024-09-19 PROBLEM — I82.90 VENOUS THROMBOSIS: Status: RESOLVED | Noted: 2024-03-29 | Resolved: 2024-09-19

## 2024-09-19 PROBLEM — D12.6 TUBULAR ADENOMA OF COLON: Status: RESOLVED | Noted: 2020-08-28 | Resolved: 2024-09-19

## 2024-09-19 PROCEDURE — 36415 COLL VENOUS BLD VENIPUNCTURE: CPT | Performed by: FAMILY MEDICINE

## 2024-09-19 PROCEDURE — 99213 OFFICE O/P EST LOW 20 MIN: CPT | Performed by: FAMILY MEDICINE

## 2024-09-19 PROCEDURE — 3008F BODY MASS INDEX DOCD: CPT | Performed by: FAMILY MEDICINE

## 2024-09-19 PROCEDURE — 1126F AMNT PAIN NOTED NONE PRSNT: CPT | Performed by: FAMILY MEDICINE

## 2024-09-19 PROCEDURE — 3075F SYST BP GE 130 - 139MM HG: CPT | Performed by: FAMILY MEDICINE

## 2024-09-19 PROCEDURE — 84153 ASSAY OF PSA TOTAL: CPT | Mod: WESLAB | Performed by: FAMILY MEDICINE

## 2024-09-19 PROCEDURE — 73502 X-RAY EXAM HIP UNI 2-3 VIEWS: CPT | Mod: LT

## 2024-09-19 PROCEDURE — 3079F DIAST BP 80-89 MM HG: CPT | Performed by: FAMILY MEDICINE

## 2024-09-19 PROCEDURE — 1159F MED LIST DOCD IN RCRD: CPT | Performed by: FAMILY MEDICINE

## 2024-09-19 PROCEDURE — 73502 X-RAY EXAM HIP UNI 2-3 VIEWS: CPT | Mod: LEFT SIDE | Performed by: RADIOLOGY

## 2024-09-19 PROCEDURE — 1036F TOBACCO NON-USER: CPT | Performed by: FAMILY MEDICINE

## 2024-09-19 RX ORDER — PREDNISONE 20 MG/1
TABLET ORAL
Qty: 20 TABLET | Refills: 0 | Status: SHIPPED | OUTPATIENT
Start: 2024-09-19 | End: 2024-10-01

## 2024-09-19 ASSESSMENT — ENCOUNTER SYMPTOMS
DEPRESSION: 0
OCCASIONAL FEELINGS OF UNSTEADINESS: 0
LOSS OF SENSATION IN FEET: 0

## 2024-09-19 ASSESSMENT — PAIN SCALES - GENERAL: PAINLEVEL: 0-NO PAIN

## 2024-09-19 NOTE — PROGRESS NOTES
"Subjective   Patient ID: Peewee Valle is a 69 y.o. male who presents for Groin Pain.    HPI     Review of Systems    Objective   BP (!) 157/98   Pulse 63   Temp 36 °C (96.8 °F)   Resp 18   Ht 1.727 m (5' 8\")   Wt 90 kg (198 lb 6.4 oz)   SpO2 98%   BMI 30.17 kg/m²     Physical Exam  Constitutional:       General: He is not in acute distress.     Appearance: Normal appearance.   Cardiovascular:      Rate and Rhythm: Normal rate and regular rhythm.      Heart sounds: No murmur heard.  Pulmonary:      Breath sounds: Normal breath sounds. No wheezing.   Neurological:      Mental Status: He is alert.         Assessment/Plan   Problem List Items Addressed This Visit    None  Visit Diagnoses         Codes    Pain of left hip    -  Primary M25.552    Relevant Medications    predniSONE (Deltasone) 20 mg tablet    Other Relevant Orders    XR hip left with pelvis when performed 2 or 3 views    Benign prostatic hyperplasia without lower urinary tract symptoms     N40.0    Relevant Orders    PSA               "

## 2024-09-19 NOTE — PROGRESS NOTES
"Subjective   Patient ID: Peewee Valle is a 69 y.o. male who presents for Groin Pain.    HPI pt c/o groin discomfort     Review of Systems    Objective   BP (!) 157/98   Pulse 63   Temp 36 °C (96.8 °F)   Resp 18   Ht 1.727 m (5' 8\")   Wt 90 kg (198 lb 6.4 oz)   SpO2 98%   BMI 30.17 kg/m²     Physical Exam    Assessment/Plan          "

## 2024-09-20 LAB — PSA SERPL-MCNC: 0.41 NG/ML

## 2025-04-11 ENCOUNTER — APPOINTMENT (OUTPATIENT)
Dept: PRIMARY CARE | Facility: CLINIC | Age: 70
End: 2025-04-11
Payer: MEDICARE

## 2025-05-02 ENCOUNTER — OFFICE VISIT (OUTPATIENT)
Dept: PRIMARY CARE | Facility: CLINIC | Age: 70
End: 2025-05-02
Payer: MEDICARE

## 2025-05-02 VITALS
RESPIRATION RATE: 18 BRPM | BODY MASS INDEX: 30.71 KG/M2 | SYSTOLIC BLOOD PRESSURE: 112 MMHG | OXYGEN SATURATION: 97 % | TEMPERATURE: 96.9 F | DIASTOLIC BLOOD PRESSURE: 62 MMHG | HEIGHT: 68 IN | HEART RATE: 64 BPM | WEIGHT: 202.6 LBS

## 2025-05-02 DIAGNOSIS — Z00.00 ANNUAL PHYSICAL EXAM: Primary | ICD-10-CM

## 2025-05-02 PROBLEM — E66.9 OBESITY WITH BODY MASS INDEX 30 OR GREATER: Status: RESOLVED | Noted: 2020-08-18 | Resolved: 2025-05-02

## 2025-05-02 PROBLEM — D50.9 IRON DEFICIENCY ANEMIA, UNSPECIFIED: Status: RESOLVED | Noted: 2019-02-25 | Resolved: 2025-05-02

## 2025-05-02 PROBLEM — N52.9 MALE ERECTILE DISORDER: Status: RESOLVED | Noted: 2021-05-20 | Resolved: 2025-05-02

## 2025-05-02 PROCEDURE — 99215 OFFICE O/P EST HI 40 MIN: CPT | Mod: 25 | Performed by: FAMILY MEDICINE

## 2025-05-02 PROCEDURE — 99397 PER PM REEVAL EST PAT 65+ YR: CPT | Performed by: FAMILY MEDICINE

## 2025-05-02 ASSESSMENT — PATIENT HEALTH QUESTIONNAIRE - PHQ9
SUM OF ALL RESPONSES TO PHQ9 QUESTIONS 1 AND 2: 0
1. LITTLE INTEREST OR PLEASURE IN DOING THINGS: NOT AT ALL
2. FEELING DOWN, DEPRESSED OR HOPELESS: NOT AT ALL

## 2025-05-02 ASSESSMENT — ACTIVITIES OF DAILY LIVING (ADL)
BATHING: INDEPENDENT
TAKING_MEDICATION: INDEPENDENT
GROCERY_SHOPPING: INDEPENDENT
DRESSING: INDEPENDENT
DOING_HOUSEWORK: INDEPENDENT
MANAGING_FINANCES: INDEPENDENT

## 2025-05-02 ASSESSMENT — PAIN SCALES - GENERAL: PAINLEVEL_OUTOF10: 0-NO PAIN

## 2025-05-02 NOTE — PROGRESS NOTES
"Subjective   Patient ID: Peewee Valle is a 70 y.o. male who presents for Annual Exam.    HPI he is fasting . No new complaints    Review of Systems    Objective   /62   Pulse 64   Temp 36.1 °C (96.9 °F)   Resp 18   Ht 1.727 m (5' 8\")   Wt 91.9 kg (202 lb 9.6 oz)   SpO2 97%   BMI 30.81 kg/m²     Physical Exam  Vitals and nursing note reviewed.   Constitutional:       General: He is not in acute distress.  HENT:      Right Ear: Tympanic membrane and ear canal normal.      Left Ear: Tympanic membrane and ear canal normal.      Nose: Nose normal. No rhinorrhea.      Mouth/Throat:      Pharynx: Oropharynx is clear. No oropharyngeal exudate or posterior oropharyngeal erythema.      Comments: Dentition wnl  Eyes:      Extraocular Movements: Extraocular movements intact.      Conjunctiva/sclera: Conjunctivae normal.      Pupils: Pupils are equal, round, and reactive to light.   Neck:      Vascular: No carotid bruit.   Cardiovascular:      Rate and Rhythm: Normal rate and regular rhythm.      Heart sounds: Normal heart sounds. No murmur heard.  Pulmonary:      Breath sounds: Normal breath sounds. No wheezing or rhonchi.   Abdominal:      General: Bowel sounds are normal. There is no distension.      Palpations: Abdomen is soft. There is no mass.      Tenderness: There is no abdominal tenderness. There is no guarding or rebound.      Hernia: No hernia is present.   Musculoskeletal:         General: No swelling or tenderness. Normal range of motion.      Cervical back: Normal range of motion and neck supple.   Lymphadenopathy:      Cervical: No cervical adenopathy.   Skin:     General: Skin is warm.      Findings: No rash.   Neurological:      General: No focal deficit present.      Mental Status: He is alert.       Assessment/Plan   Problem List Items Addressed This Visit    None  Visit Diagnoses         Codes      Annual physical exam    -  Primary Z00.00               "

## 2025-07-18 ENCOUNTER — APPOINTMENT (OUTPATIENT)
Dept: PRIMARY CARE | Facility: CLINIC | Age: 70
End: 2025-07-18
Payer: MEDICARE

## 2025-07-18 VITALS
OXYGEN SATURATION: 98 % | BODY MASS INDEX: 29.7 KG/M2 | WEIGHT: 196 LBS | RESPIRATION RATE: 18 BRPM | HEIGHT: 68 IN | DIASTOLIC BLOOD PRESSURE: 62 MMHG | HEART RATE: 70 BPM | SYSTOLIC BLOOD PRESSURE: 138 MMHG | TEMPERATURE: 96.4 F

## 2025-07-18 DIAGNOSIS — R23.2 ABNORMAL FLUSHING AND SWEATING: ICD-10-CM

## 2025-07-18 DIAGNOSIS — R10.30 INGUINAL PAIN, UNSPECIFIED LATERALITY: Primary | ICD-10-CM

## 2025-07-18 DIAGNOSIS — R61 ABNORMAL FLUSHING AND SWEATING: ICD-10-CM

## 2025-07-18 DIAGNOSIS — H61.23 BILATERAL IMPACTED CERUMEN: ICD-10-CM

## 2025-07-18 PROCEDURE — 99213 OFFICE O/P EST LOW 20 MIN: CPT | Performed by: FAMILY MEDICINE

## 2025-07-18 PROCEDURE — 93010 ELECTROCARDIOGRAM REPORT: CPT | Performed by: FAMILY MEDICINE

## 2025-07-18 PROCEDURE — 3078F DIAST BP <80 MM HG: CPT | Performed by: FAMILY MEDICINE

## 2025-07-18 PROCEDURE — 93005 ELECTROCARDIOGRAM TRACING: CPT | Performed by: FAMILY MEDICINE

## 2025-07-18 PROCEDURE — 1159F MED LIST DOCD IN RCRD: CPT | Performed by: FAMILY MEDICINE

## 2025-07-18 PROCEDURE — 99213 OFFICE O/P EST LOW 20 MIN: CPT | Mod: 25 | Performed by: FAMILY MEDICINE

## 2025-07-18 PROCEDURE — 1126F AMNT PAIN NOTED NONE PRSNT: CPT | Performed by: FAMILY MEDICINE

## 2025-07-18 PROCEDURE — 3008F BODY MASS INDEX DOCD: CPT | Performed by: FAMILY MEDICINE

## 2025-07-18 PROCEDURE — 3075F SYST BP GE 130 - 139MM HG: CPT | Performed by: FAMILY MEDICINE

## 2025-07-18 PROCEDURE — 1036F TOBACCO NON-USER: CPT | Performed by: FAMILY MEDICINE

## 2025-07-18 ASSESSMENT — PATIENT HEALTH QUESTIONNAIRE - PHQ9
1. LITTLE INTEREST OR PLEASURE IN DOING THINGS: NOT AT ALL
2. FEELING DOWN, DEPRESSED OR HOPELESS: NOT AT ALL
SUM OF ALL RESPONSES TO PHQ9 QUESTIONS 1 AND 2: 0

## 2025-07-18 ASSESSMENT — PAIN SCALES - GENERAL: PAINLEVEL_OUTOF10: 0-NO PAIN

## 2025-07-18 NOTE — PROGRESS NOTES
"Subjective   Patient ID: Peewee Valle is a 70 y.o. male who presents for Groin Pain.    HPI pt c/o groin pain for the past week . Bl ears clogged ,no pain    Review of Systems    Objective   /62   Pulse 70   Temp 35.8 °C (96.4 °F)   Resp 18   Ht 1.727 m (5' 8\")   Wt 88.9 kg (196 lb)   SpO2 98%   BMI 29.80 kg/m²     Physical Exam  Constitutional:       General: He is not in acute distress.     Appearance: Normal appearance.     Cardiovascular:      Rate and Rhythm: Normal rate and regular rhythm.      Heart sounds: No murmur heard.  Pulmonary:      Breath sounds: Normal breath sounds. No wheezing.     Neurological:      Mental Status: He is alert.     Bl groin: nttp, neg hernias    Bl ears ceruemn impacted    Assessment/Plan   Problem List Items Addressed This Visit    None  Visit Diagnoses         Codes      Inguinal pain, unspecified laterality    -  Primary R10.30      Abnormal flushing and sweating     R23.2, R61      Bilateral impacted cerumen     H61.23               "

## 2025-07-21 ENCOUNTER — TELEPHONE (OUTPATIENT)
Dept: PRIMARY CARE | Facility: CLINIC | Age: 70
End: 2025-07-21
Payer: MEDICARE

## 2025-07-21 DIAGNOSIS — R07.9 CHEST PAIN, UNSPECIFIED TYPE: ICD-10-CM

## 2025-08-11 ENCOUNTER — APPOINTMENT (OUTPATIENT)
Dept: CARDIOLOGY | Facility: CLINIC | Age: 70
End: 2025-08-11
Payer: MEDICARE

## 2025-09-16 ENCOUNTER — APPOINTMENT (OUTPATIENT)
Dept: CARDIOLOGY | Facility: CLINIC | Age: 70
End: 2025-09-16
Payer: MEDICARE

## 2025-10-02 ENCOUNTER — APPOINTMENT (OUTPATIENT)
Dept: CARDIOLOGY | Facility: CLINIC | Age: 70
End: 2025-10-02
Payer: MEDICARE